# Patient Record
Sex: FEMALE | Race: BLACK OR AFRICAN AMERICAN | NOT HISPANIC OR LATINO | URBAN - METROPOLITAN AREA
[De-identification: names, ages, dates, MRNs, and addresses within clinical notes are randomized per-mention and may not be internally consistent; named-entity substitution may affect disease eponyms.]

---

## 2023-11-27 ENCOUNTER — TELEMEDICINE (OUTPATIENT)
Dept: BEHAVIORAL HEALTH | Facility: CLINIC | Age: 23
End: 2023-11-27
Payer: MEDICARE

## 2023-11-27 DIAGNOSIS — F41.8 OTHER SPECIFIED ANXIETY DISORDERS: ICD-10-CM

## 2023-11-27 DIAGNOSIS — F73 PROFOUND INTELLECTUAL DISABILITY: ICD-10-CM

## 2023-11-27 DIAGNOSIS — H54.7 VISION IMPAIRMENT: ICD-10-CM

## 2023-11-27 DIAGNOSIS — F63.9 IMPULSE CONTROL DISORDER: ICD-10-CM

## 2023-11-27 DIAGNOSIS — F32.5 MAJOR DEPRESSIVE DISORDER WITH SINGLE EPISODE, IN FULL REMISSION (CMS-HCC): Primary | ICD-10-CM

## 2023-11-27 DIAGNOSIS — Z91.89 AT RISK FOR SIDE EFFECT OF MEDICATION: ICD-10-CM

## 2023-11-27 DIAGNOSIS — F84.0 AUTISM SPECTRUM DISORDER (HHS-HCC): ICD-10-CM

## 2023-11-27 DIAGNOSIS — G47.9 SLEEP DISORDER: ICD-10-CM

## 2023-11-27 DIAGNOSIS — H26.9 CATARACT OF BOTH EYES, UNSPECIFIED CATARACT TYPE: ICD-10-CM

## 2023-11-27 DIAGNOSIS — F98.4 STEREOTYPIC MOVEMENT DISORDER WITH SELF-INJURIOUS BEHAVIOR: ICD-10-CM

## 2023-11-27 PROBLEM — F98.3 PICA OF INFANCY AND CHILDHOOD: Status: ACTIVE | Noted: 2023-11-27

## 2023-11-27 PROBLEM — F50.89 PICA: Status: ACTIVE | Noted: 2023-11-27

## 2023-11-27 PROBLEM — K59.00 CONSTIPATION: Status: ACTIVE | Noted: 2023-11-27

## 2023-11-27 PROBLEM — Z78.9: Status: ACTIVE | Noted: 2023-11-27

## 2023-11-27 PROBLEM — L30.9 DERMATITIS, ECZEMATOID: Status: ACTIVE | Noted: 2023-11-27

## 2023-11-27 PROBLEM — H26.8 MATURE CATARACT: Status: ACTIVE | Noted: 2023-11-27

## 2023-11-27 PROBLEM — F32.1 CURRENT MODERATE EPISODE OF MAJOR DEPRESSIVE DISORDER WITHOUT PRIOR EPISODE (MULTI): Status: ACTIVE | Noted: 2023-11-27

## 2023-11-27 PROBLEM — F32.1 CURRENT MODERATE EPISODE OF MAJOR DEPRESSIVE DISORDER WITHOUT PRIOR EPISODE (MULTI): Status: RESOLVED | Noted: 2023-11-27 | Resolved: 2023-11-27

## 2023-11-27 PROBLEM — R45.89: Status: ACTIVE | Noted: 2023-11-27

## 2023-11-27 PROBLEM — R41.89 DIFFICULTY UNDERSTANDING VERBAL LANGUAGE: Status: ACTIVE | Noted: 2023-11-27

## 2023-11-27 PROBLEM — Z01.89 ASSESSMENT OF EFFECTS OF PSYCHOTROPIC DRUG IN PATIENT AT RISK FOR METABOLIC SYNDROME: Status: ACTIVE | Noted: 2023-11-27

## 2023-11-27 PROBLEM — N94.6 DYSMENORRHEA: Status: ACTIVE | Noted: 2023-11-27

## 2023-11-27 PROBLEM — G47.01 INSOMNIA DUE TO MEDICAL CONDITION: Status: ACTIVE | Noted: 2023-11-27

## 2023-11-27 PROBLEM — F33.42 RECURRENT MAJOR DEPRESSIVE DISORDER, IN FULL REMISSION (CMS-HCC): Status: ACTIVE | Noted: 2023-11-27

## 2023-11-27 PROBLEM — Z79.899 ASSESSMENT OF EFFECTS OF PSYCHOTROPIC DRUG IN PATIENT AT RISK FOR METABOLIC SYNDROME: Status: ACTIVE | Noted: 2023-11-27

## 2023-11-27 PROCEDURE — 99215 OFFICE O/P EST HI 40 MIN: CPT | Performed by: STUDENT IN AN ORGANIZED HEALTH CARE EDUCATION/TRAINING PROGRAM

## 2023-11-27 RX ORDER — BUPROPION HYDROCHLORIDE 150 MG/1
TABLET ORAL
COMMUNITY
Start: 2023-11-09

## 2023-11-27 RX ORDER — NALTREXONE HYDROCHLORIDE 50 MG/1
1 TABLET, FILM COATED ORAL
COMMUNITY
Start: 2021-06-07 | End: 2024-04-25

## 2023-11-27 RX ORDER — ACETAMINOPHEN, DIPHENHYDRAMINE HCL, PHENYLEPHRINE HCL 325; 25; 5 MG/1; MG/1; MG/1
TABLET ORAL
COMMUNITY
Start: 2021-02-04

## 2023-11-27 RX ORDER — BUPROPION HYDROCHLORIDE 300 MG/1
TABLET ORAL
COMMUNITY
Start: 2023-11-09

## 2023-11-27 RX ORDER — OXCARBAZEPINE 600 MG/1
1 TABLET, FILM COATED ORAL 2 TIMES DAILY
COMMUNITY
Start: 2014-10-21 | End: 2024-04-25 | Stop reason: SDUPTHER

## 2023-11-27 RX ORDER — TOPIRAMATE 50 MG/1
TABLET, FILM COATED ORAL
COMMUNITY
Start: 2023-11-09

## 2023-11-27 NOTE — PROGRESS NOTES
LAST INTERVENTION   Last seen about 3 months ago in August 2023. Patient appeared to be psychiatrically and behaviorally stable. No medication changes. Patient was due for routine monitoring labs that were deferred due to covid and patient's health. Discussed logistical advice with staff. Ordered prn pre-procedure protocol in case staff find it necessary to facilitate getting labs.              HISTORY OF PRESENT ILLNESS    -Interval Changes   -Patient reported to be at about their psychiatric/behavioral baseline  -No report of new issues/concerns   -Still attends day program   -Patient had an eye appointment with an optometrist at the day program. Patient was not able to get labs done. Staff reports that home blood work was scheduled but lab staff did not show up. Staff reports that patient will be going into the lab instead.       -Behavior  Stable.  No report of behavioral concerns from staff.   Recall from prior: Reported to have baseline frequency and severity of tantrums, SIB (head-banging), and aggression (mostly headbutting). Tracking logs reportedly consistent with baseline. Aggression described as mostly patient trying to get staff out of her space. Staff report outbursts sometime triggered by the environment being too dark and perhaps disorienting. Patient has been more easily reassured and redirected when having tantrums'.         -Mood  Stable.   No report of significant irritability. No report of significant crying spells. Not appearing depressed per staff.         -Sleep  Stable. No report of issues with sleep. Appears to be getting adequate sleep daily.         -Medication  Endorses medication adherence. No report of constipation, dizziness, tremors, stiffness, drooling, or shuffling gait.        -Health  Scarring and dry skin in areas that were previously scratched from SIB. Staff reports that patient will be seeing a dermatologist.  No report of hospitalizations, new/worsening medical issues, or  changes in non-psych medication since last visit.   Recall from prior: Patient has  substantial vision loss due to cataracts, limited vision and difficulty functioning independently as a result. Guardian explained not wanting to proceed with cataract surgery given risks for SIB due to the eyes during recovery process.     >>Has not had labs in a few years. Was deferred due to covid and logistics       given patient's vision loss.           ---------------------------------------------------------------  REVIEW OF SYMPTOMS  -Anxiety: anxious in unfamiliar environments  -Psychosis: negative  -Mandy: negative  -Aggression: Previously about 2 to 3x/week, typically in response to being denied what she wants, typically hair-pulling. See HPI for recent.  -Self-injury: Previously about once a week, head-banging, sometimes hits head with her hands **See HPI  -Sleep:see HPI  -Appetite: No report of changes from baseline. Occasionally chews on or eats paper.   -Medical ROS: there is no report of difficulty urinating, constipation, seizures, or polydipsia. Has chronic rash and requires daily treatment for nasal congestion due to allergies. +very low vision due to cataracts.  >All other systems have been reviewed and are negative for complaint unless otherwise noted above            PAST PSYCHIATRIC HISTORY  -Prior diagnoses: Down's, ASD, Anxiety   -Previously followed at [facility] in [place]  -No report of prior psych hospitalizations  -History of violence: chronic physical aggression and property destruction  -History of violence: pulling people's hair   -History of self-injurious behavior: head-banging, hits head with hand  -No reported history of suicide attempt  -Previous psych meds: none known by staff at least in the last few years        DEVELOPMENTAL HISTORY  -Advanced maternal and paternal age  -Unknown if there is a history of maternal illness, injury, smoking, alcohol, drug use, or other toxic exposures during  pregnancy  -Learning difficulties: has been in special education throughout her schooling        SOCIAL HISTORY:   -Lives with 5 peers in group home that is an I[facility].   -Family: significant involvement of mother, now becoming less because of her dementia and having moved to a nursing home  -Care needs:  supervision, assistance with ADLs, assistance with iADLS  -Agency: [facility], for many years  -Work/School: Has aged out of the school system. Currently attends day program.  -Guardianship: [employer] Padilla [phone]  -Language/Communication: Non-verbal, minimal vocalizing and is usually stereotypical string of sounds. Use of gestures to make needs/wants known  -Cognitive abilities: profound intellectual disability  -Does not endorse smoking, ETOH, or illicit drug use. No history of past substance abuse.  -No reported history of significant trauma   -No reported access to firearms      PAST MEDICAL HISTORY:   -Down's Syndrome  -Autism spectrum disorder  -History of seizures in childhood, doesn't see a neurologist  -Asthma  -suspected eczema  -Cataracts ~ now with significant vision loss  -No reported history of cardiac issues  -Incontinent of urine and bowels, needs prompting regularly and unable to use restroom on her own  -Recommended health screenings: 2019 last dental appt  -PCP: Dr. Sully Guy        FAMILY HISTORY  -Mother had Parkinson's. Now , in .  -Father is ; parents had her at advanced age  -No reported family history of mental illness, substance abuse, or suicide         RISK ASSESSMENT  Patient felt to be at low acute and imminent risk of harm to self and others based on consideration of their risk and protective factors, as well as evaluation of their current clinical condition. However, chronic risk is elevated given past history of self-harming behavior and aggression towards others. Patient does not currently meet criteria for inpatient psychiatric  hospitalization given that they do not exhibit evidence of clinically significant deterioration in baseline psychiatric illness, aggression, self-injury, or ability to care for themselves. There is no evidence that patient's current caregivers/living environment are unable to safely manage patient's behavior. Outpatient management is currently felt to be appropriate and in the best interest of the patient. Overall risk mitigated by psychiatric follow-up care, use of psychotropic medication, 24/7 supervision, guardianship, and [facility] services. Have engaged patient/caregivers in safety planning. They are aware of emergency psychiatric resources available in the event of an acute psychiatric emergency, such as Mobile Crisis, 911, and local ER.         Mental Status Exam:     Appearance: adequate grooming   Build: average  Demeanor: withdrawn   Eye Contact: avoidant   Motor Activity: Average, no PMA/PMR.  Musculoskeletal: No TD, tics, or tremor appreciated. AIMS score 0.   Mood: euthymic   Affect: restricted  Speech: mostly ”non-verbal”, some vocalizations, use of gestures to make needs/wants known  Thought Process: Unable to fully assess given patient with intellectual/developmental disability and/or impaired communication abilities.   Thought Content: Unable to fully assess given patient with intellectual/developmental disability and/or impaired communication abilities.  Thought Perception: Does not appear to be responding to hallucinatory stimuli.   Orientation: alert  Attention/Concentration: normal  Cognition: intellectual disability  Insight: impaired due to intellectual/developmental disability  Judgement: impaired due to intellectual/developmental disability          --------------------------------  IMPRESSION  Female with Down Syndrome, ASD, Profound intellectual disability and past history of anxiety who initially presented for transition of care.      Initial eval:  Pesenting for new eval for purpose  of transferring her care to a specialty clinic. Patient previously diagnosed with Downs Syndrome, ASD, Anxiety, and Profound Intellectual Disability. There is no report of behavioral issues over the past year. However, does have mild SIB and physical aggression at baseline, which staff report being able to manage behaviorally. Currently, staff report concern only for weekly crying spells during which she is not easily comforted. Onset coincides with mother having stopped her twice weekly visits due to health issues last Fall. Given that this has persisted for nearly a year, I suspect that there is some depression warranting treatment. Will try increasing Prozac which is currently at a very low dose.         ####  AS OF THIS APPOINTMENT:  Patient appears to be psychiatrically and behaviorally stable. Tolerating medication and endorsing adherence. No new issues/concerns reported. Will continue current treatment plan and make no medication changes today. Labs ordered at last visit were not done. Per staff, patient was supposed to get a home blood draw but the staff did not show up. Will reorder labs and send info about home-based lab service to staff. Recurrent difficulties with patient's pharmacy sending incorrect modified prescriptions different from previous original prescriptions. Will try to engage  clinical pharmacist  to help troubleshoot this issue to minimize risk of inadvertent medication changes. Follow up in 3 months.  ####        Diagnoses:  -Major Depressive Disorder, without psychotic features, single episode  -Self-injurious behavior  -Down Syndrome   -Severe vision loss due to cataracts  -Profound Intellectual Disability  -Autism Spectrum Disorder, by history   -History of Unspecified Anxiety Disorder  -Possible history of seizures        ----------------------------------------------------------------  PLAN/MANAGEMENT/RECOMMENDATIONS  Visit of high complexity given patient's limited communication  ability and intellectual disability, and the resulting need for the presence of a third party (staff) to provide clinical information and history     #Medication management   -Continue:  --Naltrexone 50mg qam  --Melatonin 10mg one hour prior to bedtime for insomnia  --Bupropion XL 450mg qam for depression   --Abilify 25mg daily at 20:00  --Trileptal 600mg bid (per staff, documented indication is for seizures)  --Pre-procedure sedation valium protocol           #Medication Considerations  -Medication consent/assent:   Risks, benefits, alternatives, off-label uses, black box warnings, and frequent/important side effects of medications have been discussed with patient/caregiver. To the extent possible, they have voiced understanding and agreement with recommended use of psychotropic medication.     -Medical necessity for continued treatment with psychotropic medication:      [] Symptom reduction        [] Improvement in functioning      [x] Reduce risk of harm to self/others      [x] Maintenance therapy to prevent deterioration in functioning      -Rational for not reducing medication dose at this time:           [x] Significant risk of deterioration in functioning       [x] Concern for elevating risk of harm to self/others      [] Prior dose reduction unsuccessful and/or harmful      [] Psychiatric/behavioral condition not adequately stabilized/optimized       [] Medication regimen recently modified         -OARRS:   --I have personally reviewed patient's OARRS report. I have considered the risks of abuse, dependence, addiction, and diversion and feel that the potential benefits of treatment with a controlled substance currently outweigh the potential risks.  OARRS last checked on 11/27/23.     -Risk/Benefit assessment:  There is no report of signs/symptoms consistent with medication-induced impairment in daily functioning. At this time, benefits of medication felt to outweigh potential risks. But we will continue to  reassess need for psychotropic medication at regular 3 to 6 month intervals, or sooner as clinically indicated.       #Monitoring plan:   -Labs currently due  --Patient possibly unable to tolerate without sedation. Further complicated logistically by patient's low vision.  --Discussed logistics with staff for lab draw.   --Labs to monitor: CBC, CMP, TSH/T4, lipid panel, Hgb A1c     #MDM/Complexity Issues    [x] Chronic medical comorbidities     [x] Chronic risk of harm to self/others     [x] Intellectual/Developmental disability     [x] Need for independent historian due to intellectual/developmental disability and/or           impaired communication abilities     [] Controlled substance medication requiring regular monitoring     [x] Medication requiring significant ongoing monitoring for toxicity     #Counseling Provided     [x] Diagnostic impression/prognosis      [x] Risks and benefits of treatment options     [x] Instruction for management/treatment and follow-up     [x] Educated patient/caregiver about: behavioral interventions, sleep hygiene, safety planning           #Coordination of care provided    [] Family    [x] Caregiver/DSP/Staff       []Agency supervisor       [x] Nursing staff      [] SSA/          []Therapist                     []Guardian      [x]Clinical Pharmacist      #Follow-up      -in about 3 months, or sooner if new/worsening symptoms          Time  -Prep time on date of the patient encounter: 10 minutes  -Time spent directly with patient/family/caregiver: 60 minutes  -Additional time spent on patient care activities: 10 minutes  -Documentation time: 10 minutes  -Total time on date of patient encounter: 90 minutes       Scribe Attestation  By signing my name below, I, Raquel Marixa-Cristian , Scribe   attest that this documentation has been prepared under the direction and in the presence of Radha Singleton DO.

## 2023-11-27 NOTE — SIGNIFICANT EVENT
Time  -Prep time on date of the patient encounter: 10 minutes  -Time spent directly with patient/family/caregiver: 40 minutes  -Documentation time: 15 minutes  -Additional time spent on patient care activities: 30 minutes

## 2023-11-27 NOTE — PATIENT INSTRUCTIONS
AFTER VISIT SUMMARY      Date: 11/27/2023  Appointment with Psychiatrist - Dr. Singleton      Reason for Visit:  -Routine follow-up/Medication management      Discussed during Appointment:   -Interval changes since last visit  -Medication      Clinical Impression/Status Update:  -Patient appears to be psychiatrically and behaviorally stable. Tolerating medication and endorsing adherence. No new issues/concerns reported. Will continue current treatment plan and make no medication changes today.       -Risk/Benefit assessment:   Medical necessity for continued treatment with psychotropic medication:   There is no report of signs/symptoms consistent with medication-induced impairment in daily functioning. At this time, benefits of medication felt to outweigh potential risks. But we will continue to reassess need for psychotropic medication at regular 3 to 6 month intervals, or sooner as clinically indicated.      #Clinic Policies/Procedures  -Refills  Prescriptions will be sent to your pharmacy with enough refills to last until your next appointment. For established patients, we typically provide 6 refills for regular meds and 3 refills for controlled substances (e.g., ADHD stimulant medication, benzodiazepines such as lorazepam). We will not provide additional refills beyond that without having an appointment. You can schedule an appointment by calling our office at [phone].     -Paperwork Requests (e.g., [employer] for guardianship)  We ask that you please schedule an appointment if needing paperwork filled out by the doctor (e.g., [employer], FMLA form).  Please provide as much information as possible about your request and email the doctor any forms needing to be filled out prior to the appointment.       ===========================  INSTRUCTIONS/RECOMMENDATIONS  ===========================    #Medication   -No medication changes made today  --Continue taking your psych medications the same as usual     --Refills will be sent to your pharmacy    Please Note:  Modifying/Changing Prescriptions:  -Refill requests must have the same Sig (instructions), Diagnoses, and Refill quantity as the previous prescription.   -If changes are necessary, we ask that the pharmacy provide a reason when submitting their refill request.  -Otherwise, refill requests that are different from the previous prescription will not be authorized     Action Needed:  -Please have the Pharmacy resubmit their request WITHOUT changing the Sig, Dx, or Refill quantity.  -Or the pharmacy can resubmit their request if the request is amended to specify the reason for change.      #Labs  Labs needed to make sure there are no side effects to medications  Get them done 1 week before next appointment  Labs ordered, you do not need a printed lab order. Needs to be done at a  lab  You can find the nearest location here: [email or web address]   Patient to fast for 12 hours prior to blood draw.      #Follow-up  -We would like to see you again in about 3 months  --> Please call the office ([phone]) to schedule an appointment at your earliest convenience.    *If having new/worsening symptoms, please call the clinic ([phone]) to discuss being seen sooner

## 2023-12-04 PROBLEM — F32.5 MAJOR DEPRESSIVE DISORDER WITH SINGLE EPISODE, IN FULL REMISSION (CMS-HCC): Status: ACTIVE | Noted: 2023-12-04

## 2023-12-11 PROBLEM — F50.89 PICA: Status: RESOLVED | Noted: 2023-11-27 | Resolved: 2023-12-11

## 2023-12-11 PROBLEM — N94.6 DYSMENORRHEA: Status: RESOLVED | Noted: 2023-11-27 | Resolved: 2023-12-11

## 2023-12-11 PROBLEM — G47.01 INSOMNIA DUE TO MEDICAL CONDITION: Status: RESOLVED | Noted: 2023-11-27 | Resolved: 2023-12-11

## 2024-01-17 NOTE — PROGRESS NOTES
LAST INTERVENTION   Last seen about 3 months ago in August 2023. Patient appeared to be psychiatrically and behaviorally stable. No medication changes. Patient was due for routine monitoring labs that were deferred due to covid and patient's health. Discussed logistical advice with staff. Ordered prn pre-procedure protocol in case staff find it necessary to facilitate getting labs.              HISTORY OF PRESENT ILLNESS    -Interval Changes   -Patient reported to be at about their psychiatric/behavioral baseline  -No report of new issues/concerns   -Still attends day program   -Patient had an eye appointment with an optometrist at the day program. Patient was not able to get labs done. Staff reports that home blood work was scheduled but lab staff did not show up. Staff reports that patient will be going into the lab instead.       -Behavior  Stable.  No report of behavioral concerns from staff.   Recall from prior: Reported to have baseline frequency and severity of tantrums, SIB (head-banging), and aggression (mostly headbutting). Tracking logs reportedly consistent with baseline. Aggression described as mostly patient trying to get staff out of her space. Staff report outbursts sometime triggered by the environment being too dark and perhaps disorienting. Patient has been more easily reassured and redirected when having tantrums'.         -Mood  Stable.   No report of significant irritability. No report of significant crying spells. Not appearing depressed per staff.         -Sleep  Stable. No report of issues with sleep. Appears to be getting adequate sleep daily.         -Medication  Endorses medication adherence. No report of constipation, dizziness, tremors, stiffness, drooling, or shuffling gait.        -Health  Scarring and dry skin in areas that were previously scratched from SIB. Staff reports that patient will be seeing a dermatologist.  No report of hospitalizations, new/worsening medical issues, or  changes in non-psych medication since last visit.   Recall from prior: Patient has  substantial vision loss due to cataracts, limited vision and difficulty functioning independently as a result. Guardian explained not wanting to proceed with cataract surgery given risks for SIB due to the eyes during recovery process.     >>Has not had labs in a few years. Was deferred due to covid and logistics       given patient's vision loss.           ---------------------------------------------------------------  REVIEW OF SYMPTOMS  -Anxiety: anxious in unfamiliar environments  -Psychosis: negative  -Mandy: negative  -Aggression: Previously about 2 to 3x/week, typically in response to being denied what she wants, typically hair-pulling. See HPI for recent.  -Self-injury: Previously about once a week, head-banging, sometimes hits head with her hands **See HPI  -Sleep:see HPI  -Appetite: No report of changes from baseline. Occasionally chews on or eats paper.   -Medical ROS: there is no report of difficulty urinating, constipation, seizures, or polydipsia. Has chronic rash and requires daily treatment for nasal congestion due to allergies. +very low vision due to cataracts.  >All other systems have been reviewed and are negative for complaint unless otherwise noted above            PAST PSYCHIATRIC HISTORY  -Prior diagnoses: Down's, ASD, Anxiety   -Previously followed at [facility] in [place]  -No report of prior psych hospitalizations  -History of violence: chronic physical aggression and property destruction  -History of violence: pulling people's hair   -History of self-injurious behavior: head-banging, hits head with hand  -No reported history of suicide attempt  -Previous psych meds: none known by staff at least in the last few years        DEVELOPMENTAL HISTORY  -Advanced maternal and paternal age  -Unknown if there is a history of maternal illness, injury, smoking, alcohol, drug use, or other toxic exposures during  pregnancy  -Learning difficulties: has been in special education throughout her schooling        SOCIAL HISTORY:   -Lives with 5 peers in group home that is an I[facility].   -Family: significant involvement of mother, now becoming less because of her dementia and having moved to a nursing home  -Care needs:  supervision, assistance with ADLs, assistance with iADLS  -Agency: [facility], for many years  -Work/School: Has aged out of the school system. Currently attends day program.  -Guardianship: Blessing Padilla [phone]  -Language/Communication: Non-verbal, minimal vocalizing and is usually stereotypical string of sounds. Use of gestures to make needs/wants known  -Cognitive abilities: profound intellectual disability  -Does not endorse smoking, ETOH, or illicit drug use. No history of past substance abuse.  -No reported history of significant trauma   -No reported access to firearms      PAST MEDICAL HISTORY:   -Down's Syndrome  -Autism spectrum disorder  -History of seizures in childhood, doesn't see a neurologist  -Asthma  -suspected eczema  -Cataracts ~ now with significant vision loss  -No reported history of cardiac issues  -Incontinent of urine and bowels, needs prompting regularly and unable to use restroom on her own  -Recommended health screenings: 2019 last dental appt  -PCP: Dr. Sully Guy        FAMILY HISTORY  -Mother had Parkinson's. Now , in .  -Father is ; parents had her at advanced age  -No reported family history of mental illness, substance abuse, or suicide         RISK ASSESSMENT  Patient felt to be at low acute and imminent risk of harm to self and others based on consideration of their risk and protective factors, as well as evaluation of their current clinical condition. However, chronic risk is elevated given past history of self-harming behavior and aggression towards others. Patient does not currently meet criteria for inpatient psychiatric  hospitalization given that they do not exhibit evidence of clinically significant deterioration in baseline psychiatric illness, aggression, self-injury, or ability to care for themselves. There is no evidence that patient's current caregivers/living environment are unable to safely manage patient's behavior. Outpatient management is currently felt to be appropriate and in the best interest of the patient. Overall risk mitigated by psychiatric follow-up care, use of psychotropic medication, 24/7 supervision, guardianship, and [facility] services. Have engaged patient/caregivers in safety planning. They are aware of emergency psychiatric resources available in the event of an acute psychiatric emergency, such as Mobile Crisis, 911, and local ER.         Mental Status Exam:     Appearance: adequate grooming   Build: average  Demeanor: withdrawn   Eye Contact: avoidant   Motor Activity: Average, no PMA/PMR.  Musculoskeletal: No TD, tics, or tremor appreciated. AIMS score 0.   Mood: euthymic   Affect: restricted  Speech: mostly ”non-verbal”, some vocalizations, use of gestures to make needs/wants known  Thought Process: Unable to fully assess given patient with intellectual/developmental disability and/or impaired communication abilities.   Thought Content: Unable to fully assess given patient with intellectual/developmental disability and/or impaired communication abilities.  Thought Perception: Does not appear to be responding to hallucinatory stimuli.   Orientation: alert  Attention/Concentration: normal  Cognition: intellectual disability  Insight: impaired due to intellectual/developmental disability  Judgement: impaired due to intellectual/developmental disability          --------------------------------  IMPRESSION  Female with Down Syndrome, ASD, Profound intellectual disability and past history of anxiety who initially presented for transition of care.      Initial eval:  Pesenting for new eval for purpose  of transferring her care to a specialty clinic. Patient previously diagnosed with Downs Syndrome, ASD, Anxiety, and Profound Intellectual Disability. There is no report of behavioral issues over the past year. However, does have mild SIB and physical aggression at baseline, which staff report being able to manage behaviorally. Currently, staff report concern only for weekly crying spells during which she is not easily comforted. Onset coincides with mother having stopped her twice weekly visits due to health issues last Fall. Given that this has persisted for nearly a year, I suspect that there is some depression warranting treatment. Will try increasing Prozac which is currently at a very low dose.         ####  AS OF THIS APPOINTMENT:  Patient appears to be psychiatrically and behaviorally stable. Tolerating medication and endorsing adherence. No new issues/concerns reported. Will continue current treatment plan and make no medication changes today. Labs ordered at last visit were not done. Per staff, patient was supposed to get a home blood draw but the staff did not show up. Will reorder labs and send info about home-based lab service to staff. Recurrent difficulties with patient's pharmacy sending incorrect modified prescriptions different from previous original prescriptions. Will try to engage  clinical pharmacist  to help troubleshoot this issue to minimize risk of inadvertent medication changes. Follow up in 3 months.  ####        Diagnoses:  -Major Depressive Disorder, without psychotic features, single episode  -Self-injurious behavior  -Down Syndrome   -Severe vision loss due to cataracts  -Profound Intellectual Disability  -Autism Spectrum Disorder, by history   -History of Unspecified Anxiety Disorder  -Possible history of seizures        ----------------------------------------------------------------  PLAN/MANAGEMENT/RECOMMENDATIONS  Visit of high complexity given patient's limited communication  ability and intellectual disability, and the resulting need for the presence of a third party (staff) to provide clinical information and history     #Medication management   -Continue:  --Naltrexone 50mg qam  --Melatonin 10mg one hour prior to bedtime for insomnia  --Bupropion XL 450mg qam for depression   --Abilify 25mg daily at 20:00  --Trileptal 600mg bid (per staff, documented indication is for seizures)  --Pre-procedure sedation valium protocol           #Medication Considerations  -Medication consent/assent:   Risks, benefits, alternatives, off-label uses, black box warnings, and frequent/important side effects of medications have been discussed with patient/caregiver. To the extent possible, they have voiced understanding and agreement with recommended use of psychotropic medication.     -Medical necessity for continued treatment with psychotropic medication:      [] Symptom reduction        [] Improvement in functioning      [x] Reduce risk of harm to self/others      [x] Maintenance therapy to prevent deterioration in functioning      -Rational for not reducing medication dose at this time:           [x] Significant risk of deterioration in functioning       [x] Concern for elevating risk of harm to self/others      [] Prior dose reduction unsuccessful and/or harmful      [] Psychiatric/behavioral condition not adequately stabilized/optimized       [] Medication regimen recently modified         -OARRS:   --I have personally reviewed patient's OARRS report. I have considered the risks of abuse, dependence, addiction, and diversion and feel that the potential benefits of treatment with a controlled substance currently outweigh the potential risks.  OARRS last checked on 11/27/23.     -Risk/Benefit assessment:  There is no report of signs/symptoms consistent with medication-induced impairment in daily functioning. At this time, benefits of medication felt to outweigh potential risks. But we will continue to  reassess need for psychotropic medication at regular 3 to 6 month intervals, or sooner as clinically indicated.       #Monitoring plan:   -Labs currently due  --Patient possibly unable to tolerate without sedation. Further complicated logistically by patient's low vision.  --Discussed logistics with staff for lab draw.   --Labs to monitor: CBC, CMP, TSH/T4, lipid panel, Hgb A1c     #MDM/Complexity Issues    [x] Chronic medical comorbidities     [x] Chronic risk of harm to self/others     [x] Intellectual/Developmental disability     [x] Need for independent historian due to intellectual/developmental disability and/or           impaired communication abilities     [] Controlled substance medication requiring regular monitoring     [x] Medication requiring significant ongoing monitoring for toxicity     #Counseling Provided     [x] Diagnostic impression/prognosis      [x] Risks and benefits of treatment options     [x] Instruction for management/treatment and follow-up     [x] Educated patient/caregiver about: behavioral interventions, sleep hygiene, safety planning           #Coordination of care provided    [] Family    [x] Caregiver/DSP/Staff       []Agency supervisor       [x] Nursing staff      [] SSA/          []Therapist                     []Guardian      [x]Clinical Pharmacist      #Follow-up      -in about 3 months, or sooner if new/worsening symptoms          Time  -Prep time on date of the patient encounter: 10 minutes  -Time spent directly with patient/family/caregiver: 60 minutes  -Additional time spent on patient care activities: 10 minutes  -Documentation time: 10 minutes  -Total time on date of patient encounter: 90 minutes       Scribe Attestation  By signing my name below, I, Raquel Marixa-Cristian , Scribe   attest that this documentation has been prepared under the direction and in the presence of Radha Singleton DO.

## 2024-02-14 DIAGNOSIS — Z09 PSYCHIATRIC FOLLOW-UP: ICD-10-CM

## 2024-02-14 DIAGNOSIS — Z86.59 PSYCHIATRIC FOLLOW-UP: ICD-10-CM

## 2024-02-20 ENCOUNTER — TELEPHONE (OUTPATIENT)
Dept: OTHER | Age: 24
End: 2024-02-20
Payer: COMMERCIAL

## 2024-02-20 NOTE — TELEPHONE ENCOUNTER
CW Called to cancel next appointment and inform that the patient has found another psychiatry provider outside of .

## 2024-02-22 ENCOUNTER — APPOINTMENT (OUTPATIENT)
Dept: BEHAVIORAL HEALTH | Facility: CLINIC | Age: 24
End: 2024-02-22
Payer: MEDICARE

## 2024-02-22 ENCOUNTER — APPOINTMENT (OUTPATIENT)
Dept: BEHAVIORAL HEALTH | Facility: CLINIC | Age: 24
End: 2024-02-22
Payer: COMMERCIAL

## 2024-02-22 NOTE — PROGRESS NOTES
PRESENT FOR VISIT  -***         LAST INTERVENTION   Last seen in November 2023. Patient was supposed to get a home blood draw but staff did not show up. Issues with patient's pharmacy sending incorrect modified prescriptions different form previous original prescriptions. Plan was to engage  clinical pharmacist to help troubleshoot the issue.            HISTORY OF PRESENT ILLNESS    -Interval Changes  ***  -Patient reported to be at about their psychiatric/behavioral baseline  or   If not, then what is different/changed???    -No report of new issues/concerns    or  -New report of ________    And briefly  -Overall status of problems that were addressed at last visit   -Response (good or bad) to medication changes  -Clinically pertinent topics that don't fit in other HPI categories (e.g., events, changing day program, stressors, etc)        -Behavior  ***  No report of behavioral concerns from staff.   Recall from prior: Reported to have baseline frequency and severity of tantrums, SIB (head-banging), and aggression (mostly headbutting). Tracking logs reportedly consistent with baseline. Aggression described as mostly patient trying to get staff out of her space. Staff report outbursts sometime triggered by the environment being too dark and perhaps disorienting. Patient has been more easily reassured and redirected when having tantrums'.         -Mood  ***  Stable.   -No report of significant changes in mood, crying spells, frequent mood swings, or significant irritability.   -No report of concern for depression from staff/family.    -Does not endorse cardinal signs/symptoms of major depressive disorder.         -Sleep  ***  Stable. No report of issues with sleep. Appears to be getting adequate sleep daily.         -Medication  ***  Endorses medication adherence. No report of constipation, dizziness, tremors, stiffness, drooling, or shuffling gait.        -Health  ***  No report of hospitalizations, new/worsening  medical issues, or changes in non-psych medication since last visit.   Recall from prior: Patient has substantial vision loss due to cataracts, limited vision and difficulty functioning independently as a result. Guardian explained not wanting to proceed with cataract surgery given risks for SIB due to the eyes during recovery process.  Scarring and dry skin in areas that were previously scratched from SIB. Staff reports that patient will be seeing a dermatologist.     >>Has not had labs in a few years. Was deferred due to covid and logistics       given patient's vision loss.           ---------------------------------------------------------------  REVIEW OF SYMPTOMS  -Anxiety: anxious in unfamiliar environments  -Psychosis: negative  -Mandy: negative  -Aggression: Previously about 2 to 3x/week, typically in response to being denied what she wants, typically hair-pulling. See HPI for recent.  -Self-injury: Previously about once a week, head-banging, sometimes hits head with her hands **See HPI  -Sleep:see HPI  -Appetite: No report of changes from baseline. Occasionally chews on or eats paper.   -Medical ROS: there is no report of difficulty urinating, constipation, seizures, or polydipsia. Has chronic rash and requires daily treatment for nasal congestion due to allergies. +very low vision due to cataracts.  >All other systems have been reviewed and are negative for complaint unless otherwise noted above            PAST PSYCHIATRIC HISTORY  -Prior diagnoses: Down's, ASD, Anxiety   -Previously followed at [facility]  -No report of prior psych hospitalizations  -History of violence: chronic physical aggression and property destruction  -History of violence: pulling people's hair   -History of self-injurious behavior: head-banging, hits head with hand  -No reported history of suicide attempt  -Previous psych meds: none known by staff at least in the last few years        DEVELOPMENTAL HISTORY  -Advanced  maternal and paternal age  -Unknown if there is a history of maternal illness, injury, smoking, alcohol, drug use, or other toxic exposures during pregnancy  -Learning difficulties: has been in special education throughout her schooling        SOCIAL HISTORY:   -Lives with 5 peers in group home that is an ICF.   -Family: significant involvement of mother, now becoming less because of her dementia and having moved to a nursing home  -Care needs: 24/7 supervision, assistance with ADLs, assistance with iADLS  -Agency: [facility], for many years  -Work/School: Has aged out of the school system. Currently attends day program.  -Guardianship: Blessing Padilla [phone]  -Language/Communication: Non-verbal, minimal vocalizing and is usually stereotypical string of sounds. Use of gestures to make needs/wants known  -Cognitive abilities: profound intellectual disability  -Does not endorse smoking, ETOH, or illicit drug use. No history of past substance abuse.  -No reported history of significant trauma   -No reported access to firearms      PAST MEDICAL HISTORY:   -Down's Syndrome  -Autism spectrum disorder  -History of seizures in childhood, doesn't see a neurologist  -Asthma  -suspected eczema  -Cataracts ~ now with significant vision loss  -No reported history of cardiac issues  -Incontinent of urine and bowels, needs prompting regularly and unable to use restroom on her own  -Recommended health screenings: 2019 last dental appt  -PCP: Dr. Sully Guy        FAMILY HISTORY  -Mother had Parkinson's. Now , in .  -Father is ; parents had her at advanced age  -No reported family history of mental illness, substance abuse, or suicide     Mental Status Exam:     Appearance: adequate grooming   Build: average  Demeanor: withdrawn   Eye Contact: avoidant   Motor Activity: Average, no PMA/PMR.  Musculoskeletal: No TD, tics, or tremor appreciated. AIMS score 0.   Mood: euthymic   Affect:  restricted  Speech: mostly ”non-verbal”, some vocalizations, use of gestures to make needs/wants known  Thought Process: Unable to fully assess given patient with intellectual/developmental disability and/or impaired communication abilities.   Thought Content: Unable to fully assess given patient with intellectual/developmental disability and/or impaired communication abilities.  Thought Perception: Does not appear to be responding to hallucinatory stimuli.   Orientation: alert  Attention/Concentration: normal  Cognition: intellectual disability  Insight: impaired due to intellectual/developmental disability  Judgement: impaired due to intellectual/developmental disability           ***#Psychotherapy provided   -Provided ______ minutes of psychotherapy to patient and/or family/caregivers      -Psychotherapeutic interventions utilized:   Used for most visits  --Supportive, Solutions-focused, Parent/Caregiver Training  Others sometimes used  --DBT/coping skills, Mindfulness, CBT, ACT, Behavioral therapy, Social Skills training, Coaching, Insight-oriented, Psychodynamic, ERP    -Target issues/Main topics discussed:  --Psychiatric symptoms, behavior, daily life, stressors, living situation, family/friends, day program/work/school, hobbies/interests, interpersonal conflicts, parent-child conflict  --Lifestyle (diet/exercise), Smoking cessation, Employment    -Additional therapeutic interventions:   --Non-psychopharmacological Tx strategies were recommended. Family/caregivers provided with education using examples to illustrate and implementation advice offered.       -Response to therapy:  --Actively participated and responded favorably to the above psychotherapeutic interventions     RISK ASSESSMENT  Patient felt to be at low acute and imminent risk of harm to self and others based on consideration of their risk and protective factors, as well as evaluation of their current clinical condition. However, chronic risk is  elevated given past history of self-harming behavior and aggression towards others. Patient does not currently meet criteria for inpatient psychiatric hospitalization given that they do not exhibit evidence of clinically significant deterioration in baseline psychiatric illness, aggression, self-injury, or ability to care for themselves. There is no evidence that patient's current caregivers/living environment are unable to safely manage patient's behavior. Outpatient management is currently felt to be appropriate and in the best interest of the patient. Overall risk mitigated by psychiatric follow-up care, use of psychotropic medication, 24/7 supervision, guardianship, and [facility] Board services. Have engaged patient/caregivers in safety planning. They are aware of emergency psychiatric resources available in the event of an acute psychiatric emergency, such as Mobile Crisis, 911, and local ER.               --------------------------------  IMPRESSION  Female with Down Syndrome, ASD, Profound intellectual disability and past history of anxiety who initially presented for transition of care.      Initial eval:  Pesenting for new eval for purpose of transferring her care to a specialty clinic. Patient previously diagnosed with Downs Syndrome, ASD, Anxiety, and Profound Intellectual Disability. There is no report of behavioral issues over the past year. However, does have mild SIB and physical aggression at baseline, which staff report being able to manage behaviorally. Currently, staff report concern only for weekly crying spells during which she is not easily comforted. Onset coincides with mother having stopped her twice weekly visits due to health issues last Fall. Given that this has persisted for nearly a year, I suspect that there is some depression warranting treatment. Will try increasing Prozac which is currently at a very low dose.         ####  AS OF THIS APPOINTMENT:  ***  ####        Diagnoses:  -Major  Depressive Disorder, without psychotic features, single episode  -Self-injurious behavior  -Down Syndrome   -Severe vision loss due to cataracts  -Profound Intellectual Disability  -Autism Spectrum Disorder, by history   -History of Unspecified Anxiety Disorder  -Possible history of seizures        ----------------------------------------------------------------  PLAN/MANAGEMENT/RECOMMENDATIONS  Visit of high complexity given patient's limited communication ability and intellectual disability, and the resulting need for the presence of a third party (staff) to provide clinical information and history     #Medication management   -Continue:  --Naltrexone 50mg qam  --Melatonin 10mg one hour prior to bedtime for insomnia  --Bupropion XL 450mg qam for depression   --Abilify 25mg daily at 20:00  --Trileptal 600mg bid (per staff, documented indication is for seizures)  --Pre-procedure sedation valium protocol           #Medication Considerations  -Medication consent/assent:   Risks, benefits, alternatives, off-label uses, black box warnings, and frequent/important side effects of medications have been discussed with patient/caregiver. To the extent possible, they have voiced understanding and agreement with recommended use of psychotropic medication.     -Medical necessity for continued treatment with psychotropic medication:      [] Symptom reduction        [] Improvement in functioning      [x] Reduce risk of harm to self/others      [x] Maintenance therapy to prevent deterioration in functioning      -Rational for not reducing medication dose at this time:           [x] Significant risk of deterioration in functioning       [x] Concern for elevating risk of harm to self/others      [] Prior dose reduction unsuccessful and/or harmful      [] Psychiatric/behavioral condition not adequately stabilized/optimized       [] Medication regimen recently modified         -OARRS:   --I have personally reviewed patient's OARRS  report. I have considered the risks of abuse, dependence, addiction, and diversion and feel that the potential benefits of treatment with a controlled substance currently outweigh the potential risks.  OARRS last checked on 11/27/23.     -Risk/Benefit assessment:  There is no report of signs/symptoms consistent with medication-induced impairment in daily functioning. At this time, benefits of medication felt to outweigh potential risks. But we will continue to reassess need for psychotropic medication at regular 3 to 6 month intervals, or sooner as clinically indicated.       #Medical Monitoring Plan:   -Labs currently due  --Patient possibly unable to tolerate without sedation. Further complicated logistically by patient's low vision.  --Discussed logistics with staff for lab draw.   --Labs to monitor: CBC, CMP, TSH/T4, lipid panel, Hgb A1c      ***#Psychotherapy with E/M services provided as documented above      #MDM/Complexity Issues    [x] Chronic medical comorbidities     [x] Chronic risk of harm to self/others     [x] Intellectual/Developmental disability     [x] Need for independent historian due to intellectual/developmental disability and/or           impaired communication abilities     [] Controlled substance medication requiring regular monitoring     [x] Medication requiring significant ongoing monitoring for toxicity     #Counseling Provided     [x] Diagnostic impression/prognosis      [x] Risks and benefits of treatment options     [x] Instruction for management/treatment and follow-up     [x] Educated patient/caregiver about: behavioral interventions, sleep hygiene, safety planning           #Coordination of care provided    [] Family    [x] Caregiver/DSP/Staff       []Agency supervisor       [x] Nursing staff      [] SSA/          []Therapist                     []Guardian      [x]Clinical Pharmacist      ***#Follow-up      -in 4 to 6 weeks, or sooner if new/worsening symptoms    -in  about 3 months, or sooner if new/worsening symptoms    --Have asked patient's family/caregiver to call our office at [phone] for scheduling.       >> Scheduled virtual Follow-up Appt on 12/05/2024 at 13:00           Time  -Prep time on date of the patient encounter: 10 minutes  -Time spent directly with patient/family/caregiver: 40 minutes  -Additional time spent on patient care activities: 10 minutes  -Documentation time: 10 minutes  -Total time on date of patient encounter: 90 minutes       Scribe Attestation  By signing my name below, I, Raquel Stonerdiana , Scribe   attest that this documentation has been prepared under the direction and in the presence of Radha Singleton DO.

## 2024-03-22 ENCOUNTER — APPOINTMENT (OUTPATIENT)
Dept: DERMATOLOGY | Facility: CLINIC | Age: 24
End: 2024-03-22
Payer: MEDICARE

## 2024-04-02 RX ORDER — OXCARBAZEPINE 600 MG/1
600 TABLET, FILM COATED ORAL 2 TIMES DAILY
Status: CANCELLED | OUTPATIENT
Start: 2024-04-02

## 2024-04-25 ENCOUNTER — OFFICE VISIT (OUTPATIENT)
Dept: NEUROLOGY | Facility: HOSPITAL | Age: 24
End: 2024-04-25
Payer: MEDICARE

## 2024-04-25 DIAGNOSIS — Z87.898 HISTORY OF SEIZURE: Primary | ICD-10-CM

## 2024-04-25 PROCEDURE — 99204 OFFICE O/P NEW MOD 45 MIN: CPT | Performed by: NURSE PRACTITIONER

## 2024-04-25 PROCEDURE — 99214 OFFICE O/P EST MOD 30 MIN: CPT | Performed by: NURSE PRACTITIONER

## 2024-04-25 PROCEDURE — 1036F TOBACCO NON-USER: CPT | Performed by: NURSE PRACTITIONER

## 2024-04-25 RX ORDER — OXCARBAZEPINE 600 MG/1
600 TABLET, FILM COATED ORAL 2 TIMES DAILY
Qty: 60 TABLET | Refills: 11 | Status: SHIPPED | OUTPATIENT
Start: 2024-04-25 | End: 2025-04-25

## 2024-04-25 RX ORDER — HYDROXYZINE HYDROCHLORIDE 25 MG/1
25 TABLET, FILM COATED ORAL 2 TIMES DAILY
COMMUNITY
Start: 2024-04-12

## 2024-04-25 RX ORDER — ARIPIPRAZOLE 5 MG/1
5 TABLET ORAL NIGHTLY
COMMUNITY

## 2024-04-25 RX ORDER — ARIPIPRAZOLE 20 MG/1
20 TABLET ORAL NIGHTLY
COMMUNITY
Start: 2019-01-10

## 2024-04-25 NOTE — PATIENT INSTRUCTIONS
"Thank you for coming to the [facility]psy [facility] today.  -If you have any sudden new, concerning or worsening symptoms, call 911 and go to the Emergency Room. Otherwise, it was good seeing you today-    -Please follow seizure precautions:   Please do not drive, operate any heavy machinery, swim unsupervised, please shower without collection of water instead of bathe. Be cautious around hot, heavy, or sharp objects. Do not cook with an open flame and do not perform any activities at heights such as on a ladder. These precautions should stay in place until 6 months seizure free and cleared by a provider.    -HOW TO CONTACT FREDDY LORA EPILEPSY NURSE PRACTITIONER ([phone]).   Instructed to call in the event of seizure, medication refills, or any questions  *Please allow 24-48 hours for non-urgent responses*.  For emergency concerns, please dial 911 or present to the nearest emergency room.  For concerns after business hours (8am-4:30pm) or on weekends please call [phone]  To call and schedule a follow up appointment please call [phone]  -Paperwork may take up to 3 business days to complete-    Every attempt is made to run on time for your appointment, if you are 15 minutes or later for your appoinement you may be asked to reschedule    -Compliance education: It is important to continue to try and achieve seizure control because of the potential for injury and illness due to seizures. In a very small minority of patients with generalized tonic clonic seizures (\"grand mal\"), breathing or heart function can stop during a seizure and result in demise (sudden unexpected death in epilepsy or SUDEP). [place] from seizures prevents this kind of outcome-     Please continue to take oxcarbazepine (Trileptal) 600mg twice a day, which is a good medication for focal seizures. At higher doses, you may experience some double vision, drowsiness, or changes in your blood sodium, though it would be unlikely at your current " dose.8     At this time it is unclear if Anabell still needs Anti-seizure medication but I do not think she would tolerate EEG testing. I would recommned continung on OXC as she doesn't appear to be having ill effects and risk of her seizures resuming could lead to falls or injury. RTC yearly

## 2024-04-25 NOTE — PROGRESS NOTES
Patient ID: Anabell Singleton 23 y.o.female presenting as new patient for seizures.     HPI  PMH: Down's, ASD, Anxiety , profound intellectual disability, history of seizures in childhood  She follows with Dr. Maynard for behaviors    Social:  Lives with 5 peers in group home that is an ICF.   24/7 supervision, assistance with ADLs, assistance with iADLS   She attends a day program  Guardianship: Blessing Padilla [phone]       PRESENT CONCERNS:  Presents with group home aid who has been working with her for some time now. She nor any other staff have ever witnessed seizure. They are concerned for frequent nosebleeds. OXC 600mg bid is on her med list . No new or concerning behaviors. denies any shaking, jerking, convulsions,  waking up with tongue or cheek biting, or unexplained bruising      Review of Systems  All other systems reviewed and negative unless otherwise stated above    CONTROLLED SUBSTANCE  N/a      RESULTS:  EEG:  No EEG results found for the past 12 months    EKG:  No results found for this or any previous visit (from the past 4464 hour(s)).    LABS:      IMAGING:  No MRI head results found for the past 12 months    No CT head results found for the past 12 months    No results found for this or any previous visit.    Vitals:  There were no vitals filed for this visit.    PHYSICAL EXAM:  Neurologic Exam   Mostly non-verbal but can make gestures   She does not appear in distress today     ASSESSMENT & PLAN:   23 y.o. female presenting in follow-up for peviously diagnosed epilepsy. Semiology as described above    Problem List Items Addressed This Visit    None  Visit Diagnoses       History of seizure    -  Primary    Relevant Medications    OXcarbazepine (Trileptal) 600 mg tablet            At this time it is unclear if Anabell still needs Anti-seizure medication but I do not think she would tolerate EEG testing. I would recommned continung on OXC as she doesn't appear to be having ill effects  and risk of her seizures resuming could lead to falls or injury. RTC yearly

## 2024-06-21 ENCOUNTER — APPOINTMENT (OUTPATIENT)
Dept: DERMATOLOGY | Facility: CLINIC | Age: 24
End: 2024-06-21
Payer: MEDICARE

## 2024-06-21 DIAGNOSIS — L20.89 OTHER ATOPIC DERMATITIS: Primary | ICD-10-CM

## 2024-06-21 DIAGNOSIS — L08.89 OTHER SPECIFIED LOCAL INFECTIONS OF THE SKIN AND SUBCUTANEOUS TISSUE: ICD-10-CM

## 2024-06-21 PROCEDURE — 99204 OFFICE O/P NEW MOD 45 MIN: CPT | Performed by: DERMATOLOGY

## 2024-06-21 RX ORDER — TACROLIMUS 1 MG/G
OINTMENT TOPICAL
Qty: 30 G | Refills: 11 | Status: SHIPPED | OUTPATIENT
Start: 2024-06-21

## 2024-06-21 RX ORDER — TRIAMCINOLONE ACETONIDE 1 MG/G
OINTMENT TOPICAL
Qty: 80 G | Refills: 3 | Status: SHIPPED | OUTPATIENT
Start: 2024-06-21

## 2024-06-21 RX ORDER — BACITRACIN 500 [USP'U]/G
OINTMENT TOPICAL
COMMUNITY
Start: 2019-03-07

## 2024-06-21 RX ORDER — CEPHALEXIN 500 MG/1
500 CAPSULE ORAL 3 TIMES DAILY
Qty: 30 CAPSULE | Refills: 0 | Status: SHIPPED | OUTPATIENT
Start: 2024-06-21 | End: 2024-07-01

## 2024-06-21 NOTE — PROGRESS NOTES
Subjective     Anabell Singleton is a 23 y.o. female who presents for the following: Itching (Resides at [facility] ([phone] is the Nurse Line). Patient scratches sides of neck - uses aquaphor and Bacitracin./Patient is nonverbal - with Manager and caregiver ).     History of eczema since childhood but has never had such a severe flare requiring a dermatologist until the last year.     Review of Systems:  No other skin or systemic complaints other than what is documented elsewhere in the note.    The following portions of the chart were reviewed this encounter and updated as appropriate:   Tobacco  Allergies  Meds  Problems  Med Hx  Surg Hx         PMH: Down's, ASD, Anxiety , profound intellectual disability, history of seizures in childhood     Social:  Lives with 5 peers in group home that is an ICF.   24/7 supervision, assistance with ADLs, assistance with iADLS   She attends a day program  Guardianship: Blessing Padilla [phone]     Skin Cancer History  No skin cancer on file.      Specialty Problems          Dermatology Problems    Dermatitis, eczematoid        Objective   Well appearing patient in no apparent distress; mood and affect are within normal limits.    A focused skin examination was performed. All findings within normal limits unless otherwise noted below.    Assessment/Plan   1. Other atopic dermatitis  Lichenified plaques with numerous excoriations and yellow crusting on lateral neck and left mandible. Xerosis and thin eczematous plaques on face    -severe, with evidence of superinfection; poorly controlled  -Atopic dermatitis was reviewed in detail including pathogenesis of the disorder  -We reviewed that atopic dermatitis is a multifactorial disorder.  In patients who are predisposed, there is defective barrier function of the skin.  This can lead to excess water loss which turns into xerosis.  Inflammation then follows which can then cause symptoms of pruritus.  An effective  treatment regimen addresses all of these issues.    -We also reviewed the waxing and waning course of atopic dermatitis and discussed the concept that this is a chronic disorder where a cure is not possible, but the goal of treatment is to control the disease.   -Treatment options discussed in detail.  -For face: Begin use of tacrolimus 0.1% ointment twice daily until flat/smooth.   -For THICK areas of eczema on the body (such as neck): Begin use of Triamcinolone 0.1% ointment twice daily until flat/smooth  -Potential side effects of topical steroids and proper use discussed in detail.    Pruritus  -We reviewed the etiology of pruritus as related to atopic dermatitis.  -Continue use of hydroxyzine 10mg tablet, take 2-3 tabs 30 minutes prior to bedtime. Reviewed side effects of medication including drowsiness.     Xerosis Cutis  -We discussed the etiology of dry skin as related to atopic dermatitis.  -Recommend daily baths for 5 minutes in lukewarm water with gentle fragrance free cleansers.  When out of the shower pat dry and apply an emollient (ointment based preferred) to the skin while still damp.      Related Medications  tacrolimus (Protopic) 0.1 % ointment  Apply to affected areas on face twice daily. Medication can burn with first few applications    triamcinolone (Kenalog) 0.1 % ointment  Apply to affected areas on neck two times a day. NOT for face.    2. Other specified local infections of the skin and subcutaneous tissue    Clear evidence of yellow crusting and oozing indicating likely secondarily infected by staph aureus. No history of MRSA. Will empirically treat with course of Keflex today.    Related Medications  cephalexin (Keflex) 500 mg capsule  Take 1 capsule (500 mg) by mouth 3 times a day for 10 days.    Follow up in one month    Arnol Edwards DO, MPH  PGY-4, Department of Dermatology    I saw and evaluated the patient. I personally obtained the key and critical portions of the history and  physical exam or was physically present for key and critical portions performed by the resident/fellow. I reviewed the resident/fellow's documentation and discussed the patient with the resident/fellow. I agree with the resident/fellow's medical decision making as documented in the note.    Boy De La Rosa MD PhD

## 2024-12-18 ENCOUNTER — APPOINTMENT (OUTPATIENT)
Dept: DERMATOLOGY | Facility: CLINIC | Age: 24
End: 2024-12-18
Payer: MEDICARE

## 2024-12-18 DIAGNOSIS — L73.2 HIDRADENITIS SUPPURATIVA: Primary | ICD-10-CM

## 2024-12-18 DIAGNOSIS — L98.1 NEUROTIC EXCORIATIONS: ICD-10-CM

## 2024-12-18 DIAGNOSIS — L91.0 KELOID: ICD-10-CM

## 2024-12-18 PROCEDURE — 99214 OFFICE O/P EST MOD 30 MIN: CPT | Performed by: STUDENT IN AN ORGANIZED HEALTH CARE EDUCATION/TRAINING PROGRAM

## 2024-12-18 RX ORDER — MUPIROCIN 20 MG/G
OINTMENT TOPICAL DAILY
Qty: 30 G | Refills: 3 | Status: SHIPPED | OUTPATIENT
Start: 2024-12-18

## 2024-12-18 RX ORDER — BENZOYL PEROXIDE 50 MG/ML
LIQUID TOPICAL EVERY MORNING
Qty: 142 G | Refills: 3 | Status: SHIPPED | OUTPATIENT
Start: 2024-12-18 | End: 2025-12-18

## 2024-12-18 RX ORDER — CLINDAMYCIN PHOSPHATE 10 UG/ML
LOTION TOPICAL EVERY MORNING
Qty: 60 ML | Refills: 3 | Status: SHIPPED | OUTPATIENT
Start: 2024-12-18 | End: 2025-12-18

## 2024-12-18 RX ORDER — DOXYCYCLINE 100 MG/1
100 CAPSULE ORAL 2 TIMES DAILY
Qty: 28 CAPSULE | Refills: 0 | Status: SHIPPED | OUTPATIENT
Start: 2024-12-18 | End: 2025-01-01

## 2024-12-18 NOTE — PROGRESS NOTES
Subjective     Anabell Singleton is a 24 y.o. female who presents for the following: Dermatitis (Follow up for atopic dermatitis. Currently treating with Tacrolimus 0.1% to face and Triamcinolone to body and neck. Today patient is flaring on chest and both sides of neck.), Hidradenitis Suppurativa (Caretaker states that right and left axilla have flared in past. PCP treated with Augmentin.), and Keloid (Right earlobe has developed a cyst-like keloid recently.).     Review of Systems:  No other skin or systemic complaints other than what is documented elsewhere in the note.    The following portions of the chart were reviewed this encounter and updated as appropriate:          Skin Cancer History  No skin cancer on file.      Specialty Problems          Dermatology Problems    Dermatitis, eczematoid        Objective   Well appearing patient in no apparent distress; mood and affect are within normal limits.    A focused skin examination was performed. All findings within normal limits unless otherwise noted below.    Assessment/Plan   1. Hidradenitis suppurativa  Left Axilla, Right Axilla  Unable to fully examine   - per pt aide there are boils present on the bilateral axillae     Hidradenitis suppurativa- flaring  - Pt has been getting recurrent pustules and boils in her bilateral axillae. They are extremely tender to touch and start to leak pus and smell. She has been on 4 courses of augmentin with no improvement  - start benzoyl peroxide 5% external wash daily to the bilateral axilla  - start clindamycin 1% lotion daily to the bilateral axilla  - start doxycycline 100 mg bid x14 days. Discussed that she should take it with food and at least 8 ounces of water. Advised to not lay down after taking medication. Discussed teratogenic effects with patient and aide.     Related Medications  benzoyl peroxide 5 % external wash  Apply topically once daily in the morning. May bleach fabrics, use an old or white  towel    clindamycin (Cleocin T) 1 % lotion  Apply topically once daily in the morning. To new acne bumps    doxycycline (Vibramycin) 100 mg capsule  Take 1 capsule (100 mg) by mouth 2 times a day for 14 days. Take with food and at least 8 ounces (large glass) of water, do not lie down for 30 minutes after; use sun protection    2. Neurotic excoriations  Impetigized linear excoriations on the neck and face  Pt picking at face when agitated during visit    - Discussed that the scratching is related to behavior.   - We recommend to continue frequent moisturizing with Aquaphor as being performed by the aid  - Mupirocin 2% ointment for when the lesions become infected    Related Medications  mupirocin (Bactroban) 2 % ointment  Apply topically once daily.    3. Keloid  Right Ear  Shiny, thick, fibrotic pink-brown plaque.    Benign, reassurance provided  Pt dislikes needles. Will not inject with ILK in today's visit      Patient seen and discussed with Dr. Mantilla,   Lily Crum MD MPH  PGY-2, Department of Dermatology    I saw and evaluated the patient. I personally obtained the key and critical portions of the history and physical exam or was physically present for key and critical portions performed by the resident. I reviewed the resident's documentation and discussed the patient with the resident. I agree with the resident's medical decision making as documented in the note.    Alice Mantilla MD

## 2024-12-18 NOTE — PATIENT INSTRUCTIONS
Andi Adkins,     It was nice seeing you in clinic today:   For the hidradenitis suppurativa  - apply benzoyl peroxide 5% wash in the shower daily. Apply to the underarms. This will bleach towels  - apply clindamycin 1% lotion after the shower to the underarms.  - take doxycycline 2x/day for 2 weeks. This should help knock down the boils. She should take it with food and a full glass of water. She should not have any milk or dairy products with the pill.     For the scratches on the face and neck:   - apply mupirocin ointment when the spots start to have pus, drainage, and odor    Please let our office know if you have any questions,   Dr. Mantilla

## 2024-12-23 ENCOUNTER — APPOINTMENT (OUTPATIENT)
Dept: OTOLARYNGOLOGY | Facility: CLINIC | Age: 24
End: 2024-12-23
Payer: COMMERCIAL

## 2025-01-27 ENCOUNTER — APPOINTMENT (OUTPATIENT)
Dept: DERMATOLOGY | Facility: CLINIC | Age: 25
End: 2025-01-27
Payer: COMMERCIAL

## 2025-01-27 DIAGNOSIS — L20.89 OTHER ATOPIC DERMATITIS: Primary | ICD-10-CM

## 2025-01-27 PROCEDURE — 99214 OFFICE O/P EST MOD 30 MIN: CPT | Performed by: DERMATOLOGY

## 2025-01-27 RX ORDER — DUPILUMAB 300 MG/2ML
INJECTION, SOLUTION SUBCUTANEOUS
Qty: 2 PEN | Refills: 0 | Status: SHIPPED | OUTPATIENT
Start: 2025-01-27

## 2025-01-27 RX ORDER — TRIAMCINOLONE ACETONIDE 1 MG/G
OINTMENT TOPICAL
Qty: 80 G | Refills: 3 | Status: SHIPPED | OUTPATIENT
Start: 2025-01-27

## 2025-01-27 RX ORDER — DUPILUMAB 300 MG/2ML
INJECTION, SOLUTION SUBCUTANEOUS
Qty: 2 PEN | Refills: 11 | Status: SHIPPED | OUTPATIENT
Start: 2025-01-27

## 2025-01-27 ASSESSMENT — DERMATOLOGY PATIENT ASSESSMENT
ARE YOU AN ORGAN TRANSPLANT RECIPIENT: NO
ARE YOU ON BIRTH CONTROL: NO
WHERE ARE THESE NEW OR CHANGING LESIONS LOCATED: UNDER ARM
ARE YOU TRYING TO GET PREGNANT: NO
DO YOU HAVE ANY NEW OR CHANGING LESIONS: YES
HAVE YOU HAD OR DO YOU HAVE A STAPH INFECTION: NO
DO YOU HAVE IRREGULAR MENSTRUAL CYCLES: NO
DO YOU USE A TANNING BED: NO
HAVE YOU HAD OR DO YOU HAVE VASCULAR DISEASE: NO

## 2025-01-27 ASSESSMENT — DERMATOLOGY QUALITY OF LIFE (QOL) ASSESSMENT
WHAT SINGLE SKIN CONDITION LISTED BELOW IS THE PATIENT ANSWERING THE QUALITY-OF-LIFE ASSESSMENT QUESTIONS ABOUT: HIDRADENITIS SUPPURATIVA
RATE HOW EMOTIONALLY BOTHERED YOU ARE BY YOUR SKIN PROBLEM (FOR EXAMPLE, WORRY, EMBARRASSMENT, FRUSTRATION): 6 - ALWAYS BOTHERED
ARE THERE EXCLUSIONS OR EXCEPTIONS FOR THE QUALITY OF LIFE ASSESSMENT: NO
RATE HOW BOTHERED YOU ARE BY SYMPTOMS OF YOUR SKIN PROBLEM (EG, ITCHING, STINGING BURNING, HURTING OR SKIN IRRITATION): 6 - ALWAYS BOTHERED
DATE THE QUALITY-OF-LIFE ASSESSMENT WAS COMPLETED: 67232
RATE HOW BOTHERED YOU ARE BY EFFECTS OF YOUR SKIN PROBLEMS ON YOUR ACTIVITIES (EG, GOING OUT, ACCOMPLISHING WHAT YOU WANT, WORK ACTIVITIES OR YOUR RELATIONSHIPS WITH OTHERS): 0 - NEVER BOTHERED

## 2025-01-27 ASSESSMENT — PATIENT GLOBAL ASSESSMENT (PGA): PATIENT GLOBAL ASSESSMENT: PATIENT GLOBAL ASSESSMENT:  4 - SEVERE

## 2025-01-27 ASSESSMENT — ITCH NUMERIC RATING SCALE: HOW SEVERE IS YOUR ITCHING?: 9

## 2025-01-27 NOTE — PROGRESS NOTES
Subjective     Anabell Singleton is a 24 y.o. female who presents for the following: Atopic Dermatitis.     Patient presents for follow up of atopic dermatitis. She is accompanied by caregivers from her group home.     The caregivers report that her eczema has been flaring despite topical therapy. She has persistent itch that has not improved with use of topical fluocinonide oil. They report that she often digs into her face due to itch.     Of note, she also saw Dr. Mantilla in December 2024 for HS. The caregivers report they have been using all topicals as prescribed.     Review of Systems:  No other skin or systemic complaints other than what is documented elsewhere in the note.    The following portions of the chart were reviewed this encounter and updated as appropriate:          Skin Cancer History  No skin cancer on file.      Specialty Problems          Dermatology Problems    Dermatitis, eczematoid        Objective   Well appearing patient in no apparent distress; mood and affect are within normal limits.    A focused skin examination was performed. All findings within normal limits unless otherwise noted below.    Assessment/Plan   1. Other atopic dermatitis  Widespread lichenified plaques with multiple scattered erosions secondary to excoriation. BSA 25-30%.    Atopic Dermatitis  - severe, poorly controlled (current BSA 25-30%) without benefit after trial of multiple topical corticosteroids including triamcinolone, fluocinonide, tacrolimus  -Treatment options discussed in detail. Given that she has severe, persistent disease that has not responded to topical therapy we recommend escalation of therapy to Dupixent. Discussed the risks, benefits, potential adverse effects of treatment.   - START Dupixent. Prior authorization started today.  -For thick areas of eczema on the body (such as neck): Continue use of Triamcinolone 0.1% ointment twice daily until flat/smooth  -Potential side effects of topical  steroids and proper use discussed in detail.  -Follow up in 5 months    dupilumab (Dupixent Pen) 300 mg/2 mL pen injector  LOADING DOSE: Inject 2 pens by subcutaneous injection at once.    dupilumab (Dupixent Pen) 300 mg/2 mL pen injector  Inject 1 pen by subcutaneous injection every 14 days    Related Procedures  Follow Up In Dermatology - Established Patient    Related Medications  tacrolimus (Protopic) 0.1 % ointment  Apply to affected areas on face twice daily. Medication can burn with first few applications    triamcinolone (Kenalog) 0.1 % ointment  Apply to affected areas on neck two times a day until skin is flat and smooth. Avoid application to the face.      Raghavendra Barraza MD  PGY-3, Department of Dermatology    I saw and evaluated the patient. I personally obtained the key and critical portions of the history and physical exam or was physically present for key and critical portions performed by the resident/fellow. I reviewed the resident/fellow's documentation and discussed the patient with the resident/fellow. I agree with the resident/fellow's medical decision making as documented in the note.    Boy De La Rosa MD PhD

## 2025-01-28 ENCOUNTER — SPECIALTY PHARMACY (OUTPATIENT)
Dept: PHARMACY | Facility: CLINIC | Age: 25
End: 2025-01-28

## 2025-01-31 PROCEDURE — RXMED WILLOW AMBULATORY MEDICATION CHARGE

## 2025-02-03 ENCOUNTER — APPOINTMENT (OUTPATIENT)
Facility: CLINIC | Age: 25
End: 2025-02-03
Payer: COMMERCIAL

## 2025-02-03 ENCOUNTER — CLINICAL SUPPORT (OUTPATIENT)
Dept: AUDIOLOGY | Facility: CLINIC | Age: 25
End: 2025-02-03
Payer: MEDICAID

## 2025-02-03 DIAGNOSIS — F73 PROFOUND INTELLECTUAL DISABILITY: ICD-10-CM

## 2025-02-03 DIAGNOSIS — H91.90 HEARING LOSS, UNSPECIFIED HEARING LOSS TYPE, UNSPECIFIED LATERALITY: Primary | ICD-10-CM

## 2025-02-03 DIAGNOSIS — F84.0 AUTISM SPECTRUM DISORDER (HHS-HCC): ICD-10-CM

## 2025-02-03 DIAGNOSIS — M95.11 CAULIFLOWER EAR, RIGHT: Primary | ICD-10-CM

## 2025-02-03 PROCEDURE — 92579 VISUAL AUDIOMETRY (VRA): CPT

## 2025-02-03 PROCEDURE — 1036F TOBACCO NON-USER: CPT | Performed by: STUDENT IN AN ORGANIZED HEALTH CARE EDUCATION/TRAINING PROGRAM

## 2025-02-03 PROCEDURE — 99204 OFFICE O/P NEW MOD 45 MIN: CPT | Performed by: STUDENT IN AN ORGANIZED HEALTH CARE EDUCATION/TRAINING PROGRAM

## 2025-02-03 NOTE — PROGRESS NOTES
ENT Outpatient Consultation    Chief Complaint: ear lesion  History Of Present Illness  Anabell Singleton is a 24 y.o. female presents with her caregivers for evaluation of right sided cauliflower ear. She is MRDD and engages in self-injurious behavior which resulted in a right ear injury a few months ago. At her group home this was being managed with ice packs, no other intervention was performed. Now is does not appear to bother her but there was concern that the right ear swelling could be fluid-filled or infectious. No concern for change in hearing.     Past Medical History  She has a past medical history of Autistic disorder ([facility]), Current moderate episode of major depressive disorder without prior episode ([facility]) (11/27/2023), Dysmenorrhea (11/27/2023), Insomnia due to medical condition (11/27/2023), Other conditions influencing health status, Personal history of other diseases of the respiratory system, Pica (11/27/2023), and Unspecified convulsions (Multi).    Surgical History  She has no past surgical history on file.     Social History  She reports that she has never smoked. She has never used smokeless tobacco. She reports that she does not drink alcohol and does not use drugs.    Family History  No family history on file.     Allergies  Patient has no known allergies.     Physical Exam:  CONSTITUTIONAL:  developmental delayed, syndromic facies  VOICE:  No hoarseness or other abnormality  RESPIRATION:  Breathing comfortably, no stridor  EYES:  EOM intact, sclera normal  NEURO:  Alert, does not interact with examiner. Allowed minimal physical exam.  HEAD AND FACE:  scattered facial excoriations and eczema  EARS:  right scaphoid fossa/super antihelix is replaced by abnormal cartilage consistent with cauliflower ear. No overlying skin changes or concern for ongoing infection. Cartilaginous changes do not obstruct her EAC. Left external ear is normal in appearance  NOSE:  External nose midline  PSYCH:   agitated, engaged in self-injurious behavior during visit     Last Recorded Vitals  There were no vitals taken for this visit.    Relevant Results    No results found.    Assessment and Plan  24 y.o. female with right sided cauliflower ear that developed after a self-inflicted injury a few months go. No concern for ongoing infection or acute abscess/hematoma. I discussed with her caregivers today that the only reason to intervene would be for cosmetic concerns and options include steroid injections vs surgical intervention. They would not like to pursue any treatment at this time. They will RTC if new issues arise.    Problem List Items Addressed This Visit       Profound intellectual disability    Cauliflower ear, right - Primary       Chantal Griffith MD

## 2025-02-03 NOTE — PROGRESS NOTES
AUDIOLOGIC EVALUATION  Name: Anabell Singleton  YOB: 2000  MRN: [id number]  Age: 24 y.o.    Date of Evaluation:  2/3/2025    History:  Anabell Singleton, 24 y.o., was seen today for a hearing evaluation in a conjunction visit with Chantal Griffith MD. The patient is accompanied to today's appointment by their caretakers. They report that she has cauliflower ear secondary to self-injury. This occurred in November 2024. This has happened in the past and it resolved on it's own. They denied concerns for the patient's hearing and no previous otologic surgeries were reported. Medical history is notable for profound intellectual disability, autism spectrum disorder, and cataracts.    Evaluation:  Otoscopy:  Left external ear appears normal. Patient has significant swelling on the right upper pinna.  Could not complete otoscopy due to patient ear defensiveness.     Tympanometry:   Right:  Could not test due to patient ear defensiveness.   Left:   Could not test due to patient ear defensiveness.     Distortion Product Otoacoustic Emissions (DPOAEs):   Right:  Could not test due to patient ear defensiveness.   Left:  Could not test due to patient ear defensiveness.     Testing was completed using visual reinforcement audiometry (VRA) in the sound field. One response was obtained in the normal range at 4000 Hz. A response to speech stimuli was obtained in the mild range in the sound field. No further testing could be completed due to patient cooperation.    NOTE: Today's results are considered Minimum Response Levels (MRLs); it is possible that true audiometric thresholds are better.    Impressions  Today's evaluation revealed a minimum response level in the normal range at 4000 Hz in at least the better hearing ear. A speech awareness threshold was found in the mild range in the sound field. No further testing could be completed due to patient cooperation.    Hearing loss cannot be ruled out at this time. A sedated  ABR is recommended if her caretakers begin to have concerns for her hearing.     Recommendations  - Continue medical follow-up with established providers   - Continue medical follow-up with Chantal Griffith MD  - Sedated ABR in conjunction with other procedures, pending caretaker concerns     Time: 9967-5049    KRISTEL Dean, CCC-A  Licensed Audiologist

## 2025-02-06 ENCOUNTER — SPECIALTY PHARMACY (OUTPATIENT)
Dept: PHARMACY | Facility: CLINIC | Age: 25
End: 2025-02-06

## 2025-02-07 ENCOUNTER — PHARMACY VISIT (OUTPATIENT)
Dept: PHARMACY | Facility: CLINIC | Age: 25
End: 2025-02-07
Payer: COMMERCIAL

## 2025-02-13 ENCOUNTER — SPECIALTY PHARMACY (OUTPATIENT)
Dept: PHARMACY | Facility: CLINIC | Age: 25
End: 2025-02-13

## 2025-02-20 ENCOUNTER — SPECIALTY PHARMACY (OUTPATIENT)
Dept: PHARMACY | Facility: CLINIC | Age: 25
End: 2025-02-20

## 2025-02-27 PROCEDURE — RXMED WILLOW AMBULATORY MEDICATION CHARGE

## 2025-02-28 ENCOUNTER — PHARMACY VISIT (OUTPATIENT)
Dept: PHARMACY | Facility: CLINIC | Age: 25
End: 2025-02-28
Payer: COMMERCIAL

## 2025-03-24 ENCOUNTER — SPECIALTY PHARMACY (OUTPATIENT)
Dept: PHARMACY | Facility: CLINIC | Age: 25
End: 2025-03-24

## 2025-03-24 PROCEDURE — RXMED WILLOW AMBULATORY MEDICATION CHARGE

## 2025-03-25 ENCOUNTER — PHARMACY VISIT (OUTPATIENT)
Dept: PHARMACY | Facility: CLINIC | Age: 25
End: 2025-03-25
Payer: COMMERCIAL

## 2025-04-19 PROCEDURE — RXMED WILLOW AMBULATORY MEDICATION CHARGE

## 2025-04-22 ENCOUNTER — SPECIALTY PHARMACY (OUTPATIENT)
Dept: PHARMACY | Facility: CLINIC | Age: 25
End: 2025-04-22

## 2025-04-23 ENCOUNTER — PHARMACY VISIT (OUTPATIENT)
Dept: PHARMACY | Facility: CLINIC | Age: 25
End: 2025-04-23
Payer: COMMERCIAL

## 2025-04-24 ENCOUNTER — APPOINTMENT (OUTPATIENT)
Dept: OPHTHALMOLOGY | Facility: CLINIC | Age: 25
End: 2025-04-24
Payer: COMMERCIAL

## 2025-05-20 ENCOUNTER — SPECIALTY PHARMACY (OUTPATIENT)
Dept: PHARMACY | Facility: CLINIC | Age: 25
End: 2025-05-20

## 2025-05-20 ENCOUNTER — APPOINTMENT (OUTPATIENT)
Dept: OPHTHALMOLOGY | Facility: CLINIC | Age: 25
End: 2025-05-20
Payer: COMMERCIAL

## 2025-05-20 DIAGNOSIS — H18.20 CORNEA EDEMA: ICD-10-CM

## 2025-05-20 DIAGNOSIS — H44.523 PHTHISIS BULBI OF BOTH EYES: Primary | ICD-10-CM

## 2025-05-20 DIAGNOSIS — H26.221 CATARACT OF RIGHT EYE SECONDARY TO OCULAR DISORDER: ICD-10-CM

## 2025-05-20 PROCEDURE — 99203 OFFICE O/P NEW LOW 30 MIN: CPT | Performed by: OPHTHALMOLOGY

## 2025-05-20 PROCEDURE — RXMED WILLOW AMBULATORY MEDICATION CHARGE

## 2025-05-20 ASSESSMENT — VISUAL ACUITY
OS_SC: BTL
OD_SC: BTL
METHOD: SNELLEN - LINEAR

## 2025-05-20 ASSESSMENT — TONOMETRY
OD_IOP_MMHG: DEFER
OS_IOP_MMHG: DEFER
IOP_METHOD: GOLDMANN APPLANATION

## 2025-05-20 ASSESSMENT — SLIT LAMP EXAM - LIDS
COMMENTS: GOOD POSITION
COMMENTS: GOOD POSITION

## 2025-05-20 ASSESSMENT — CONF VISUAL FIELD: COMMENTS: UTO

## 2025-05-20 ASSESSMENT — EXTERNAL EXAM - LEFT EYE: OS_EXAM: NORMAL

## 2025-05-20 ASSESSMENT — EXTERNAL EXAM - RIGHT EYE: OD_EXAM: NORMAL

## 2025-05-20 ASSESSMENT — ENCOUNTER SYMPTOMS: EYES NEGATIVE: 1

## 2025-05-20 NOTE — PROGRESS NOTES
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

## 2025-05-21 ENCOUNTER — PHARMACY VISIT (OUTPATIENT)
Dept: PHARMACY | Facility: CLINIC | Age: 25
End: 2025-05-21
Payer: COMMERCIAL

## 2025-05-22 ENCOUNTER — SPECIALTY PHARMACY (OUTPATIENT)
Dept: PHARMACY | Facility: CLINIC | Age: 25
End: 2025-05-22

## 2025-05-22 NOTE — PROGRESS NOTES
[facility] Clinical Note  Patient Reassessment     Introduction  Anabell Singleton is a 24 y.o. female who is on the specialty pharmacy service for management of: Dermatology Core.      [facility] supplied medication: dupilumab (Dupixent Pen) 300 mg/2 mL pen injector   Inject 1 pen by subcutaneous injection every 14 days        Duration of therapy: Maintenance    The most recent encounter visit with the referring prescriber Boy De La Rosa MD PhD on 01/27/25 was reviewed.  Pharmacy will continue to collaborate in the care of this patient with the referring prescriber.    Discussion  Anabell was contacted on 5/22/2025 at 5:04 PM for a pharmacy visit with encounter number [id number] from:   [facility]  [address] [place] [id number]  Dept: [phone]  Dept Fax: [phone]  Caregiver (FLAVIA Milton) consented to a/an Telephone visit, which was performed.    Efficacy  Patient has developed new symptoms of condition: No  Patient/caregiver feels medication is affecting the disease state: Caregiver notes about a 25% improvement so far since starting Dupixent. He denied the patient having any recent flares and denied any recent topical agent use.    Goals  Provided education on goals and possible outcomes of therapy:  Adherence with therapy  Timely completion of appropriate labs  Timely and appropriate follow up with provider  Identify and address medication interactions with presciption medications, OTC medications and supplements  Optimize or maintain quality of life  Dermatology: Prevent or reduce disease flares  Reduce pain, itchiness, inflammation and body surface area affected by atopic dermatitis  Patient has documented target(s) for goals of therapy: Yes    Targets       Target Due Completed Completed By Outcome Source     Goal: Prevent and reduce disease flares 9/22/2025 -- -- -- Clinical Management - 2/3/2025         Goal: Reduce use of ancillary or  "\"prn\" medications 9/22/2025 -- -- -- Clinical Management - 2/3/2025         Goal: Prevent and reduce disease flares 6/3/2025 5/22/2025 Rose Pedraza PharmD On Track Clinical Management - 2/3/2025    Caregiver denies any recent flares.       Goal: Reduce use of ancillary or \"prn\" medications 6/3/2025 5/22/2025 Rose Pedraza PharmD On Track Clinical Management - 2/3/2025    Caregiver denies any recent use of topical agents.              Tolerance  Patient has experienced side effects from this medication: No  Changes to current therapy regimen: No    The follow-up timeline was discussed. Every person responds to and reacts to therapy differently. Patient should be assessed for efficacy and tolerability in approximately: other - 4 months            Adherence  Patient Information  Informant: Caregiver  Demonstrates Understanding of Importance of Adherence: Yes  Does the patient have any barriers to self-administration (including physical and mental?): Yes  Barriers to Self-Administration: Pt has down syndrome  Action Taken to Mitigate Barriers for Self-Administration: Spoke with FLAVIA Milton at group home  Support Network for Adherence: Healthcare Provider  Medication Information  Medication: dupilumab (Dupixent Pen)  Patient Reported Missed Doses in the Last 4 Weeks: 0  Estimated Medication Adherence Level: Good  Adherence Estimation Source: Claims history  Barriers to Adherence: No Problems identified   The importance of adherence was discussed and patient/caregiver was advised to take the medication as prescribed by their provider. Encouraged patient/caregiver to call physician's office or specialty pharmacy if they have a question regarding a missed dose.    General Assessment  Changes to home medications, OTCs or supplements: No  Current Medications[1]  Reported new allergies: No  Reported new medical conditions: No  Additional monitoring reviewed: Dermatology- No lab monitoring needed- There are no routine " laboratory monitoring parameters for this medication  Is laboratory follow up needed? No    Advised to contact the pharmacy if there are any changes to the patient's medication list, including prescriptions, OTC medications, herbal products, or supplements.    Impression/Plan  This patient has been identified as high risk due to Mentally impaired.  The following action was taken:Engaged patient support system          QOL/Patient Satisfaction  Rate your quality of life on scale of 1-10:  (unable to assess)  Rate your satisfaction with  Specialty Pharmacy on scale of 1-10: 10 - Completely satisfied    Provided contact information ([phone]) for [facility] and reviewed dispensing process, refill timeline and patient management follow up. Confirmed understanding of education conducted during assessment. All questions and concerns were addressed and patient/caregiver was encouraged to reach out for additional questions or concerns.    Based on the patient's diagnosis, medication list, progress towards goals, adherence, tolerance, and medication list, medication remains appropriate: Therapy remains appropriate (I attest)    Rose Pedraza, PharmD       [1]   Current Outpatient Medications   Medication Sig Dispense Refill    ARIPiprazole (Abilify) 20 mg tablet Take 1 tablet (20 mg) by mouth once daily at bedtime.      ARIPiprazole (Abilify) 5 mg tablet Take 1 tablet (5 mg) by mouth once daily at bedtime.      bacitracin 500 unit/gram ointment TWICE A DAY TO LEFT NECK WOUND TILL HEALED      benzoyl peroxide 5 % external wash Apply topically once daily in the morning. May bleach fabrics, use an old or white towel 142 g 3    buPROPion XL (Wellbutrin XL) 150 mg 24 hr tablet       buPROPion XL (Wellbutrin XL) 300 mg 24 hr tablet       clindamycin (Cleocin T) 1 % lotion Apply topically once daily in the morning. To new acne bumps 60 mL 3    dupilumab (Dupixent Pen) 300 mg/2 mL pen injector  Inject 2 pens under the skin at once for a loading dose 2 Pen 0    dupilumab (Dupixent Pen) 300 mg/2 mL pen injector Inject 1 pen by subcutaneous injection every 14 days 2 Pen 11    hydrOXYzine HCL (Atarax) 25 mg tablet Take 1 tablet (25 mg) by mouth 2 times a day.      melatonin 10 mg tablet Take by mouth.      mupirocin (Bactroban) 2 % ointment Apply topically once daily. 30 g 3    OXcarbazepine (Trileptal) 600 mg tablet Take 1 tablet (600 mg) by mouth 2 times a day. 60 tablet 11    tacrolimus (Protopic) 0.1 % ointment Apply to affected areas on face twice daily. Medication can burn with first few applications 30 g 11    topiramate (Topamax) 50 mg tablet       triamcinolone (Kenalog) 0.1 % ointment Apply to affected areas on neck two times a day until skin is flat and smooth. Avoid application to the face. 80 g 3     No current facility-administered medications for this visit.

## 2025-06-09 ENCOUNTER — APPOINTMENT (OUTPATIENT)
Dept: DERMATOLOGY | Facility: CLINIC | Age: 25
End: 2025-06-09
Payer: COMMERCIAL

## 2025-06-09 DIAGNOSIS — L20.89 OTHER ATOPIC DERMATITIS: ICD-10-CM

## 2025-06-09 PROCEDURE — 99213 OFFICE O/P EST LOW 20 MIN: CPT | Performed by: DERMATOLOGY

## 2025-06-09 RX ORDER — TRIAMCINOLONE ACETONIDE 1 MG/G
OINTMENT TOPICAL
Qty: 80 G | Refills: 5 | Status: SHIPPED | OUTPATIENT
Start: 2025-06-09

## 2025-06-09 NOTE — PROGRESS NOTES
Subjective     Anabell Singleton is a 24 y.o. female who presents for the following: Eczema (Patient resides in [facility] located in [facility]. Using Triamcinolone ointment - better than last visit. /Has not started Dupixent ).     Patient last seen 1/2025 for atopic dermatitis. At that visit, patient was started on Dupixent and continued on topical triamcinolone 0.1% ointment until smooth/flat.     Today, patient is accompanied by two of her caregivers from [facility]. They report she has not started Dupixent. She is using topical triamcinolone 0.1% ointment as prescribed with only slight improvement. Still with active plaques. They report patient does not scratch her neck.          Review of Systems:  No other skin or systemic complaints other than what is documented elsewhere in the note.    The following portions of the chart were reviewed this encounter and updated as appropriate:         Skin Cancer History  Biopsy Log Book  No skin cancers from Specimen Tracking.    Additional History      Specialty Problems          Dermatology Problems    Dermatitis, eczematoid        Objective   Well appearing patient in no apparent distress; mood and affect are within normal limits.    A partial examination was performed including scalp, head, eyes, ears, nose, lips, and neck. All findings within normal limits unless otherwise noted below.    Assessment/Plan   Skin Exam  1. OTHER ATOPIC DERMATITIS  Generalized  On the anterior and bilateral lateral neck, there are hyperpigmented, lichenified plaques with multiple scattered erosions secondary to excoriation.       Atopic Dermatitis  - severe, poorly controlled without benefit after trial of multiple topical corticosteroids/calcineurin inhibitors including triamcinolone, fluocinonide, and tacrolimus  -Treatment options discussed in detail. Given that she has severe, persistent disease that has not responded to topical therapy we recommend escalation of  therapy to Dupixent. Discussed the risks, benefits, potential adverse effects of treatment.   - Dupixent was prescribed at last visit, but patient's caregivers report that she has not started injections. Patient resides in [facility]. Nurse contact: [phone]. Will contact specialty pharmacy and nurse as per chart review appears as if dupixent is shipped monthly.   -For thick areas of eczema on the body (such as neck): Continue use of Triamcinolone 0.1% ointment twice daily until flat/smooth. Discussed risks of topical steroid overuse including atrophy, striae, and telangiectasias.   -RTC in 6 months.   Related Procedures  Follow Up In Dermatology - Established Patient  Follow Up In Dermatology - Established Patient  Related Medications  tacrolimus (Protopic) 0.1 % ointment  Apply to affected areas on face twice daily. Medication can burn with first few applications  dupilumab (Dupixent Pen) 300 mg/2 mL pen injector  Inject 2 pens under the skin at once for a loading dose  dupilumab (Dupixent Pen) 300 mg/2 mL pen injector  Inject 1 pen by subcutaneous injection every 14 days  triamcinolone (Kenalog) 0.1 % ointment  Apply to affected areas on neck two times a day until skin is flat and smooth. Avoid application to the face.    RTC in 6 months for follow up of atopic dermatitis.     Roxi Waldron DO     I saw and evaluated the patient. I personally obtained the key and critical portions of the history and physical exam or was physically present for key and critical portions performed by the resident/fellow. I reviewed the resident/fellow's documentation and discussed the patient with the resident/fellow. I agree with the resident/fellow's medical decision making as documented in the note.    Boy De La Rosa MD PhD

## 2025-06-09 NOTE — Clinical Note
Atopic Dermatitis  - severe, poorly controlled without benefit after trial of multiple topical corticosteroids/calcineurin inhibitors including triamcinolone, fluocinonide, and tacrolimus  -Treatment options discussed in detail. Given that she has severe, persistent disease that has not responded to topical therapy we recommend escalation of therapy to Dupixent. Discussed the risks, benefits, potential adverse effects of treatment.   - Dupixent was prescribed at last visit, but patient's caregivers report that she has not started injections. Patient resides in [facility]. Nurse contact: [phone]. Will contact specialty pharmacy and nurse as per chart review appears as if dupixent is shipped monthly.   -For thick areas of eczema on the body (such as neck): Continue use of Triamcinolone 0.1% ointment twice daily until flat/smooth. Discussed risks of topical steroid overuse including atrophy, striae, and telangiectasias.   -RTC in 6 months.

## 2025-06-09 NOTE — Clinical Note
On the anterior and bilateral lateral neck, there are hyperpigmented, lichenified plaques with multiple scattered erosions secondary to excoriation.

## 2025-06-24 ENCOUNTER — SPECIALTY PHARMACY (OUTPATIENT)
Dept: PHARMACY | Facility: CLINIC | Age: 25
End: 2025-06-24

## 2025-06-24 PROCEDURE — RXMED WILLOW AMBULATORY MEDICATION CHARGE

## 2025-06-26 ENCOUNTER — PHARMACY VISIT (OUTPATIENT)
Dept: PHARMACY | Facility: CLINIC | Age: 25
End: 2025-06-26
Payer: COMMERCIAL

## 2025-07-17 PROCEDURE — RXMED WILLOW AMBULATORY MEDICATION CHARGE

## 2025-07-18 ENCOUNTER — SPECIALTY PHARMACY (OUTPATIENT)
Dept: PHARMACY | Facility: CLINIC | Age: 25
End: 2025-07-18

## 2025-07-24 ENCOUNTER — PHARMACY VISIT (OUTPATIENT)
Dept: PHARMACY | Facility: CLINIC | Age: 25
End: 2025-07-24
Payer: COMMERCIAL

## 2025-08-20 ENCOUNTER — PHARMACY VISIT (OUTPATIENT)
Dept: PHARMACY | Facility: CLINIC | Age: 25
End: 2025-08-20
Payer: COMMERCIAL

## 2025-08-20 ENCOUNTER — SPECIALTY PHARMACY (OUTPATIENT)
Dept: PHARMACY | Facility: CLINIC | Age: 25
End: 2025-08-20

## 2025-08-20 PROCEDURE — RXMED WILLOW AMBULATORY MEDICATION CHARGE

## 2025-08-26 ENCOUNTER — SPECIALTY PHARMACY (OUTPATIENT)
Dept: PHARMACY | Facility: CLINIC | Age: 25
End: 2025-08-26

## 2025-12-01 ENCOUNTER — APPOINTMENT (OUTPATIENT)
Dept: DERMATOLOGY | Facility: CLINIC | Age: 25
End: 2025-12-01
Payer: COMMERCIAL